# Patient Record
Sex: MALE | Race: WHITE
[De-identification: names, ages, dates, MRNs, and addresses within clinical notes are randomized per-mention and may not be internally consistent; named-entity substitution may affect disease eponyms.]

---

## 2019-07-28 ENCOUNTER — HOSPITAL ENCOUNTER (EMERGENCY)
Dept: HOSPITAL 74 - JER | Age: 24
Discharge: HOME | End: 2019-07-28
Payer: SELF-PAY

## 2019-07-28 VITALS — BODY MASS INDEX: 22.9 KG/M2

## 2019-07-28 VITALS — TEMPERATURE: 98.6 F | HEART RATE: 83 BPM | DIASTOLIC BLOOD PRESSURE: 87 MMHG | SYSTOLIC BLOOD PRESSURE: 122 MMHG

## 2019-07-28 DIAGNOSIS — R07.9: Primary | ICD-10-CM

## 2019-07-28 LAB
ALBUMIN SERPL-MCNC: 4.2 G/DL (ref 3.4–5)
ALP SERPL-CCNC: 68 U/L (ref 45–117)
ALT SERPL-CCNC: 16 U/L (ref 13–61)
ANION GAP SERPL CALC-SCNC: 5 MMOL/L (ref 8–16)
AST SERPL-CCNC: 9 U/L (ref 15–37)
BASOPHILS # BLD: 0.5 % (ref 0–2)
BILIRUB SERPL-MCNC: 0.7 MG/DL (ref 0.2–1)
BUN SERPL-MCNC: 10.4 MG/DL (ref 7–18)
CALCIUM SERPL-MCNC: 9.2 MG/DL (ref 8.5–10.1)
CHLORIDE SERPL-SCNC: 106 MMOL/L (ref 98–107)
CO2 SERPL-SCNC: 30 MMOL/L (ref 21–32)
CREAT SERPL-MCNC: 1 MG/DL (ref 0.55–1.3)
DEPRECATED RDW RBC AUTO: 13 % (ref 11.9–15.9)
EOSINOPHIL # BLD: 0.7 % (ref 0–4.5)
GLUCOSE SERPL-MCNC: 94 MG/DL (ref 74–106)
HCT VFR BLD CALC: 41.8 % (ref 35.4–49)
HGB BLD-MCNC: 14.7 GM/DL (ref 11.7–16.9)
LYMPHOCYTES # BLD: 36.3 % (ref 8–40)
MCH RBC QN AUTO: 31.6 PG (ref 25.7–33.7)
MCHC RBC AUTO-ENTMCNC: 35.1 G/DL (ref 32–35.9)
MCV RBC: 90.1 FL (ref 80–96)
MONOCYTES # BLD AUTO: 9.4 % (ref 3.8–10.2)
NEUTROPHILS # BLD: 53.1 % (ref 42.8–82.8)
PLATELET # BLD AUTO: 241 K/MM3 (ref 134–434)
PMV BLD: 7.8 FL (ref 7.5–11.1)
POTASSIUM SERPLBLD-SCNC: 4 MMOL/L (ref 3.5–5.1)
PROT SERPL-MCNC: 7.4 G/DL (ref 6.4–8.2)
RBC # BLD AUTO: 4.64 M/MM3 (ref 4–5.6)
SODIUM SERPL-SCNC: 141 MMOL/L (ref 136–145)
WBC # BLD AUTO: 5.7 K/MM3 (ref 4–10)

## 2019-07-28 NOTE — EKG
Test Reason : 

Blood Pressure : ***/*** mmHG

Vent. Rate : 076 BPM     Atrial Rate : 076 BPM

   P-R Int : 164 ms          QRS Dur : 086 ms

    QT Int : 336 ms       P-R-T Axes : 077 097 061 degrees

   QTc Int : 378 ms

 

*** POOR DATA QUALITY, INTERPRETATION MAY BE ADVERSELY AFFECTED

NORMAL SINUS RHYTHM WITH SINUS ARRHYTHMIA

RIGHTWARD AXIS

BORDERLINE ECG

NO PREVIOUS ECGS AVAILABLE

Confirmed by SUNDEEP WESTBROOK MD ([id number]) on 7/28/2019 8:57:18 PM

 

Referred By:             Confirmed By:SUNDEEP WESTBROOK MD

## 2019-07-28 NOTE — PDOC
History of Present Illness





<Atiya Del Rosario - Last Filed: 07/28/19 16:06>





- History of Present Illness


Initial Comments: 





07/28/19 14:42


25 y/o M with no significant pmhx presenting to the ED with 2 hrs of left sided 

chest pain radiating to his shoulder. Describes the pain as throbbing 

intermittent pain with episodes lasting approx. 2 mins. Pain happens both at 

rest and exertion and is worst at rest and is 6/10 in severity. He denies 

having any cough, SOB, pleuritic chest pain, hemoptysis, trauma to the area,

fevers, chills, diaphoresis, recent drug use (cocaine) or previous episode of 

chest pain in the past.


07/28/19 14:45








<Bia Weems - Last Filed: 07/28/19 16:09>





- General


Chief Complaint: Chest Pain


Stated Complaint: CHEST PAIN


Time Seen by Provider: 07/28/19 14:15





Past History





<Atiya Del Rosario - Last Filed: 07/28/19 16:06>





- Past Medical History


COPD: No





- Suicide/Smoking/Psychosocial Hx


Smoking History: Never smoked





<Bia Weems - Last Filed: 07/28/19 16:09>





- Past Medical History


Allergies/Adverse Reactions: 


 Allergies











Allergy/AdvReac Type Severity Reaction Status Date / Time


 


No Known Allergies Allergy   Verified 07/28/19 14:03














*Physical Exam





- Vital Signs


 Last Vital Signs











Temp Pulse Resp BP Pulse Ox


 


 98.6 F   83   18   122/87   99 


 


 07/28/19 14:05  07/28/19 14:05  07/28/19 14:05  07/28/19 14:05  07/28/19 14:05














<Atiya Del Rosario - Last Filed: 07/28/19 16:06>





- Vital Signs


 Last Vital Signs











Temp Pulse Resp BP Pulse Ox


 


 98.6 F   83   18   122/87   99 


 


 07/28/19 14:05  07/28/19 14:05  07/28/19 14:05  07/28/19 14:05  07/28/19 14:05














<Bia Weems - Last Filed: 07/28/19 16:09>





ED Treatment Course





- LABORATORY


CBC & Chemistry Diagram: 


 07/28/19 14:46





 07/28/19 14:46





- ADDITIONAL ORDERS


Additional order review: 


 Laboratory  Results











  07/28/19





  14:46


 


Sodium  141


 


Potassium  4.0


 


Chloride  106


 


Carbon Dioxide  30


 


Anion Gap  5 L


 


BUN  10.4


 


Creatinine  1.0


 


Est GFR (CKD-EPI)AfAm  121.55


 


Est GFR (CKD-EPI)NonAf  104.88


 


Random Glucose  94


 


Calcium  9.2


 


Total Bilirubin  0.7


 


AST  9 L


 


ALT  16


 


Alkaline Phosphatase  68


 


Creatine Kinase  78


 


Troponin I  < 0.02


 


Total Protein  7.4


 


Albumin  4.2








 











  07/28/19





  14:46


 


RBC  4.64


 


MCV  90.1


 


MCHC  35.1


 


RDW  13.0


 


MPV  7.8


 


Neutrophils %  53.1


 


Lymphocytes %  36.3


 


Monocytes %  9.4


 


Eosinophils %  0.7


 


Basophils %  0.5














<Atiya Del Rosario - Last Filed: 07/28/19 16:06>





- LABORATORY


CBC & Chemistry Diagram: 


 07/28/19 14:46





 07/28/19 14:46





<Bia Weems - Last Filed: 07/28/19 16:09>





Medical Decision Making





- Medical Decision Making





07/28/19 15:33





25 y/o M with no significant hx presenting with chest pain of 2 hrs duration.





Labs/Imaging/Meds





- CXR, cardiac profile, cbc,cmp, ekg





-EkG: Normal sinus rhythm with sinus arrhythmia with rightward axis deviation. 

No T wave inversion or ST elevation.


-No acute chest pathology on CXR


-Heart score





<Bia Weems - Last Filed: 07/28/19 16:09>





*DC/Admit/Observation/Transfer





- Discharge Dispostion


Decision to Admit order: No





<Atiya Del Rosario - Last Filed: 07/28/19 16:06>





<Bia Weems - Last Filed: 07/28/19 16:09>


Diagnosis at time of Disposition: 


Chest pain


Qualifiers:


 Chest pain type: unspecified Qualified Code(s): R07.9 - Chest pain, unspecified








- Referrals


Referrals: 


Chandrika Gongora MD [Primary Care Provider] - 


Sander Ruffin MD [Staff Physician] - 





- Patient Instructions


Printed Discharge Instructions:  DI for Chest Pain


Additional Instructions: 


You were seen in the ED for chest pain. All your labs, chest x-ray and ekg were 

negative. You have also been given 600 mg of Motrin for pain. Follow up with 

you PCP on Monday Dr. Chandrika Gongora. You have also been given follow up 

information for a cardiologist as well.  Return to the ER if you develop 

worsening chest pain, shortness of breath, or chest pain when breathing. Rest 

and hydrate as  needed.





- Post Discharge Activity


Forms/Work/School Notes:  Back to Work

## 2019-07-28 NOTE — PDOC
Documentation entered by Collette Munoz SCRIBE, acting as scribe for 

Atiya Del Rosario DO.








Atiya Del Rosario DO:  This documentation has been prepared by the Alexander dean Mackenzie, SCRIBE, under my direction and personally reviewed by me 

in its entirety.  I confirm that the documentation accurately reflects all work

, treatment, procedures, and medical decision making performed by me.  





Attending Attestation





- Resident


Resident Name: Bia Weems





- ED Attending Attestation


I have performed the following: I have examined & evaluated the patient, The 

case was reviewed & discussed with the resident, I agree w/resident's findings 

& plan





- HPI


HPI: 


The patient is a 24 year old male, with no significant PMH who presents to the 

emergency department  with 2 hours of sharp intermittent left sided chest pain 

radiating to his left shoulder. Patient states the pain came on suddenly and 

throbs intermittently every few minutes. Pain is random and not aggravated by 

exertion or alleviated at rest. Patient denies any recent cough. Patient denies 

having any similar episodes in the past but endorses a habit of dislocating his 

left shoulder which he has never had surgery on. 





The patient denies shortness of breath, headache and dizziness.


Denies fever, chills, nausea, vomiting, diarrhea and constipation.


Denies dysuria, frequency, urgency and hematuria.





Allergies: NKA


Social history: Endorses smoking a Juul


Family history: History of cardiac disease, unable to specify genetically. 


PCP: Dr. Chandrika Gongora 





07/28/19 16:02








- Physicial Exam


PE: 





Constitutional: Awake, alert, oriented.  No acute distress.


Head:  Normocephalic.  Atraumatic


Eyes:  PERRL. EOMI.  Conjunctivae are not pale.


ENT:  Mucous membranes are moist and intact. Posterior pharynx without exudates 

or erythema. Uvula midline.


Neck:  Supple.  Full ROM. No lymphadenopathy.


Cardiovascular:  Regular rate.  Regular rhythm. S1, S2 regular.  Distal pulses 

are 2+ and symmetric.  


Pulmonary/Chest:  No evidence of respiratory distress.  Clear to auscultation 

bilaterally  No wheezing, rales or rhonchi.


Abdominal:  Soft and non-distended.  There is no tenderness.  No rebound, 

guarding or rigidity.  No organomegaly. No palpable masses. Good bowel sounds.


Back:  No CVA tenderness.


Musculoskeletal:  No edema.  No cyanosis.  No clubbing.  Full range of motion 

in all extremities.  Nocalf tenderness. Radial/pedal pulses are intact and 2+ 

bilaterally


Skin:  Skin is warm and dry.  No petechiae.  No purpura.  


Neurological:  Alert and oriented to person, place, and time.  Cranial nerves II

-XII are grossly intact. Normal speech. Strength is grossly symmetric. No 

sensory deficits.


Psychiatric:  Good eye contact.  Normal interaction, affect and behavior.


07/28/19 16:02








- Medical Decision Making





07/28/19 15:53


a/p: 25yo male with no pmhx with L sided cp that radiates to L shoulder


-intermittent episodes all day


-no cough, no sob, no n/v, no diaphoresis


-no fam hx of sudden cardiac death - father with hypercoag state, but pt states 

he was tested and neg


-no pleuritic component


-no calf swelling


-PERC neg


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


-labs, ekg, cxr ordered by the resident


-low suspicion for acs


-will monitor and reassess


07/28/19 15:56


cxr clear


07/28/19 15:56


labs reviewed - trop neg


no elevated wbc





07/28/19 16:07


labs and imaging reviewed with the patient


pt given work note given he is on a 24 hour ems shift-needs to see pmd tomorrow


will give cards follow up





**Heart Score/ECG Review





- ECG Intrepretation


Comment:: 





07/28/19 15:55


sinus at 76, nl axis, nl interval, no acute st/t wave findigns, early repol